# Patient Record
Sex: MALE | Race: WHITE | NOT HISPANIC OR LATINO | URBAN - METROPOLITAN AREA
[De-identification: names, ages, dates, MRNs, and addresses within clinical notes are randomized per-mention and may not be internally consistent; named-entity substitution may affect disease eponyms.]

---

## 2017-01-05 ENCOUNTER — OFFICE VISIT - HEALTHEAST (OUTPATIENT)
Dept: FAMILY MEDICINE | Facility: CLINIC | Age: 62
End: 2017-01-05

## 2017-01-05 DIAGNOSIS — Z00.00 ROUTINE ADULT HEALTH MAINTENANCE: ICD-10-CM

## 2017-01-05 LAB
CHOLEST SERPL-MCNC: 211 MG/DL
FASTING STATUS PATIENT QL REPORTED: YES
HDLC SERPL-MCNC: 57 MG/DL
LDLC SERPL CALC-MCNC: 140 MG/DL
PSA SERPL-MCNC: 1.1 NG/ML (ref 0–3.5)
TRIGL SERPL-MCNC: 68 MG/DL

## 2017-01-05 ASSESSMENT — MIFFLIN-ST. JEOR: SCORE: 1730.1

## 2017-09-19 ENCOUNTER — COMMUNICATION - HEALTHEAST (OUTPATIENT)
Dept: FAMILY MEDICINE | Facility: CLINIC | Age: 62
End: 2017-09-19

## 2018-12-26 ENCOUNTER — OFFICE VISIT - HEALTHEAST (OUTPATIENT)
Dept: FAMILY MEDICINE | Facility: CLINIC | Age: 63
End: 2018-12-26

## 2018-12-26 DIAGNOSIS — N50.811 TESTICULAR PAIN, RIGHT: ICD-10-CM

## 2018-12-26 LAB
ALBUMIN UR-MCNC: NEGATIVE MG/DL
APPEARANCE UR: CLEAR
BILIRUB UR QL STRIP: NEGATIVE
COLOR UR AUTO: YELLOW
GLUCOSE UR STRIP-MCNC: NEGATIVE MG/DL
HGB UR QL STRIP: NEGATIVE
KETONES UR STRIP-MCNC: NEGATIVE MG/DL
LEUKOCYTE ESTERASE UR QL STRIP: NEGATIVE
NITRATE UR QL: NEGATIVE
PH UR STRIP: 7 [PH] (ref 5–8)
PSA SERPL-MCNC: 1.1 NG/ML (ref 0–4.5)
SP GR UR STRIP: 1.01 (ref 1–1.03)
UROBILINOGEN UR STRIP-ACNC: NORMAL

## 2021-05-30 VITALS — WEIGHT: 212 LBS | HEIGHT: 68 IN | BODY MASS INDEX: 32.13 KG/M2

## 2021-06-02 VITALS — WEIGHT: 193.44 LBS | BODY MASS INDEX: 29.2 KG/M2

## 2021-06-08 NOTE — PROGRESS NOTES
ASSESSMENT & PLAN:    1. Routine adult health maintenance  We will update patient with a Tdap today  We will obtain fasting labs today patient did have a creamer in his coffee to note in case of glucose or triglycerides are slightly elevated  - Lipid Cascade  - Comprehensive Metabolic Panel  - PSA (Prostatic-Specific Antigen), Annual Screen  - HM2(CBC w/o Differential)    Patient here for yearly physical  No acute concerns today by the patient  No acute findings on exam  We will follow-up in 1 year or sooner if labs warrant    There are no Patient Instructions on file for this visit.    Orders Placed This Encounter   Procedures     Tdap vaccine,  8yo or older,  IM     Lipid Cascade     Order Specific Question:   Fasting is required?     Answer:   Yes     Comprehensive Metabolic Panel     PSA (Prostatic-Specific Antigen), Annual Screen     HM2(CBC w/o Differential)     There are no discontinued medications.    No Follow-up on file.    CHIEF COMPLAINT:  Chief Complaint   Patient presents with     Annual Exam     Physical exam - fasting labs        HISTORY OF PRESENT ILLNESS:  Miky is a 61 y.o. male presenting to the clinic today for a physical examination.    Skin Lesions: His wife thinks his skin lesion on his right temple is increasing in size. He also has a lesion on his left forearm becomes red and inflamed after he scratches it.     Health Maintenance: He had coffee with flavored creamer in it this morning; he forgot to fast completely. He has been walking instead of jogging; he recently did a kickboxing class with his wife but he will not continue with that. He typically walks for an hour in the morning before he goes to work; he tries to keep his back straight as he walks. He is due for a Tdap today.     REVIEW OF SYSTEMS:   Family Hx Colon Cancer: His last colonoscopy was done 2 years ago and he is due every 5 years due to his family history of colon cancer. His first colonoscopy resulted in removing 4  "polyps 20 years ago. His mother had colon cancer and passed away at age 49.     Missing Tooth: He had oral surgery and he is going to get an implant soon; he is currently wearing a partial. His right front tooth has been broken or missing since he was 15 years old.     He no longer has lower abdominal muscle strains. All other systems are negative.    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 He has 7 grandchildren. He has three grandchildren under 1 year old at this time. His fourth son was  recently. His oldest child is 33 right now.     Social History     Social History     Marital status:      Spouse name: N/A     Number of children: N/A     Years of education: N/A     Occupational History     Not on file.     Social History Main Topics     Smoking status: Never Smoker     Smokeless tobacco: Never Used     Alcohol use 1.2 oz/week     2 Standard drinks or equivalent per week     Drug use: Not on file     Sexual activity: Not on file     Other Topics Concern     Not on file     Social History Narrative     No narrative on file       No past medical history on file.    No past surgical history on file.    No Known Allergies    Active Ambulatory Problems     Diagnosis Date Noted     Benign Prostatic Hypertrophy      Abdominal Pain      Multiple Muscle Strains      Resolved Ambulatory Problems     Diagnosis Date Noted     No Resolved Ambulatory Problems     No Additional Past Medical History       Family History   Problem Relation Age of Onset     Colon cancer Mother 49     passed away from       VITALS:  Vitals:    01/05/17 0736   BP: 114/78   Patient Site: Left Arm   Patient Position: Sitting   Cuff Size: Adult Regular   Pulse: 62   Resp: 16   Temp: 98.8  F (37.1  C)   TempSrc: Oral   Weight: 212 lb (96.2 kg)   Height: 5' 8.25\" (1.734 m)     Wt Readings from Last 3 Encounters:   01/05/17 212 lb (96.2 kg)       QUALITY MEASURES:  The following high BMI " interventions were performed this visit: encouragement to exercise and weight monitoring    PHYSICAL EXAM:  GENERAL APPEARANCE: Alert, cooperative, no distress, appears stated age   LUNGS: Clear to auscultation bilaterally, respirations unlabored   HEART: Regular rate and rhythm, S1 and S2 normal, no murmur, rub, or gallop.  SKIN: Skin color, texture, turgor normal, no rashes or lesions  HEAD: Normocephalic, without obvious abnormality, atraumatic   EYES: Wearing glasses. PERRL, conjunctiva/corneas clear, EOM's intact  EARS: Normal TM's and external ear canals, both ears   NOSE: Nares normal, mucosa normal, no drainage   THROAT: Lips, mucosa, and tongue normal; teeth and gums normal   NECK: Supple, symmetrical, trachea midline, no adenopathy; thyroid: not enlarged, symmetric, no tenderness/mass/nodules; no carotid bruit or JVD   BACK: Symmetric, no curvature, ROM normal, no CVA tenderness   ABDOMEN: Soft, non-tender, bowel sounds active all four quadrants, no masses, no organomegaly   MUSCULOSKELETAL: Normal range of motion. No joint swelling or deformity.   EXTREMITIES: Extremities normal, atraumatic, no cyanosis or edema  PULSES: 2+ and symmetric all extremities  LYMPH NODES: Cervical and supraclavicular nodes normal   NEUROLOGIC: CNII-XII intact. Normal strength, sensation and reflexes   throughout   PSYCHIATRIC: Normal mood and affect.    ADDITIONAL HISTORY SUMMARIZED (FROM OLD RECORDS OR HISTORY FROM SOMEONE OTHER THAN THE PATIENT OR ANOTHER HEALTHCARE PROVIDER) (2 TOTAL): Reviewed last note 3/19/2014.     DECISION TO OBTAIN EXTRA INFORMATION (OLD RECORDS REQUESTED OR HISTORY FROM ANOTHER PERSON OR ACCESSING CARE EVERYWHERE) (1 TOTAL): None.     RADIOLOGY TESTS SUMMARIZED OR ORDERED (XRAY/CT/MRI/DXA) (1 TOTAL): None.    LABS REVIEWED OR ORDERED (1 TOTAL): Reviewed and ordered labs.    MEDICINE TESTS SUMMARIZED OR ORDERED (EKG/ECHO) (1 TOTAL): None.    INDEPENDENT REVIEW OF EKG OR X-RAY (2 EACH): None.     The  visit lasted a total of 21 minutes face to face with the patient. Over 50% of the time was spent counseling and educating the patient about his skin lesions and health maintenance.    I, Keyla Cha, am scribing for and in the presence of Dr. Whatley.    I, Dr. Whatley, personally performed the services described in this documentation, as scribed by Keyla Cha in my presence, and it is both accurate and complete.    MEDICATIONS:  Current Outpatient Prescriptions   Medication Sig Dispense Refill     aspirin 81 MG EC tablet Take 81 mg by mouth daily.       multivitamin with minerals tablet Take 1 tablet by mouth daily.       No current facility-administered medications for this visit.      Total Data Points: 3

## 2021-06-22 NOTE — PROGRESS NOTES
Assessment:   1. Testicular pain, right  Symptoms have resolved.  Encourage patient to continue to monitor for any new symptoms and follow-up with his PCP if symptoms persist.  We will get PSA and urinalysis to rule out prostatitis and BPH.  - PSA, Diagnostic (Prostatic-Specific Antigen)  - Urinalysis-UC if Indicated     Plan:   The diagnosis was discussed with the patient and evaluation and treatment plans outlined.  See orders for lab and imaging studies.  Reassured patient that symptoms are almost certainly benign and self-resolving.  Follow up as needed.     Subjective:   Miky Abraham is a 63 y.o. male who presents for evaluation of right testicular pain that occurs about the same time as right back pain.  Patient reported that they moved to [place] before [facility] this year and they drove which made him sit for very long time and also drove back to [place] for [place].  He is noticed a pain in the right after driving.  He reported that pain has improved for the past 4 days.  He denied any other symptoms including urinary frequency, diarrhea, fever, chills, and abdominal pain.  He also denied testicular swelling. he is here for the holidays and will be flying back to [place] in few days.     Review of Systems  Pertinent items are noted in HPI.       Objective:   /76 (Patient Site: Right Arm, Patient Position: Sitting, Cuff Size: Adult Regular)   Pulse (!) 55   Wt 193 lb 7 oz (87.7 kg)   SpO2 98%   BMI 29.20 kg/m    General appearance: alert, appears stated age and cooperative  Head: Normocephalic, without obvious abnormality, atraumatic  Neck: no adenopathy, no carotid bruit, no JVD, supple, symmetrical, trachea midline and thyroid not enlarged, symmetric, no tenderness/mass/nodules  Back: symmetric, no curvature. ROM normal. No CVA tenderness.  Lungs: clear to auscultation bilaterally  Chest wall: no tenderness  Abdomen: soft, non-tender; bowel sounds normal; no masses,  no  organomegaly  Pulses: 2+ and symmetric  Skin: Skin color, texture, turgor normal. No rashes or lesions  Lymph nodes: Cervical, supraclavicular, and axillary nodes normal.  Neurologic: Grossly normal

## 2021-07-24 ENCOUNTER — HEALTH MAINTENANCE LETTER (OUTPATIENT)
Age: 66
End: 2021-07-24

## 2021-09-18 ENCOUNTER — HEALTH MAINTENANCE LETTER (OUTPATIENT)
Age: 66
End: 2021-09-18

## 2022-04-24 ENCOUNTER — HEALTH MAINTENANCE LETTER (OUTPATIENT)
Age: 67
End: 2022-04-24

## 2022-11-19 ENCOUNTER — HEALTH MAINTENANCE LETTER (OUTPATIENT)
Age: 67
End: 2022-11-19

## 2023-06-01 ENCOUNTER — HEALTH MAINTENANCE LETTER (OUTPATIENT)
Age: 68
End: 2023-06-01